# Patient Record
Sex: FEMALE | Race: BLACK OR AFRICAN AMERICAN | Employment: FULL TIME | URBAN - METROPOLITAN AREA
[De-identification: names, ages, dates, MRNs, and addresses within clinical notes are randomized per-mention and may not be internally consistent; named-entity substitution may affect disease eponyms.]

---

## 2018-04-23 ENCOUNTER — ANESTHESIA EVENT (OUTPATIENT)
Dept: ENDOSCOPY | Age: 63
End: 2018-04-23
Payer: COMMERCIAL

## 2018-04-23 ENCOUNTER — HOSPITAL ENCOUNTER (OUTPATIENT)
Age: 63
Setting detail: OUTPATIENT SURGERY
Discharge: HOME | End: 2018-04-23
Attending: INTERNAL MEDICINE | Admitting: INTERNAL MEDICINE
Payer: COMMERCIAL

## 2018-04-23 ENCOUNTER — ANESTHESIA (OUTPATIENT)
Dept: ENDOSCOPY | Age: 63
End: 2018-04-23
Payer: COMMERCIAL

## 2018-04-23 VITALS
RESPIRATION RATE: 16 BRPM | HEIGHT: 62 IN | OXYGEN SATURATION: 100 % | BODY MASS INDEX: 35.15 KG/M2 | HEART RATE: 84 BPM | WEIGHT: 191 LBS | SYSTOLIC BLOOD PRESSURE: 125 MMHG | TEMPERATURE: 98.4 F | DIASTOLIC BLOOD PRESSURE: 64 MMHG

## 2018-04-23 PROCEDURE — 74011250636 HC RX REV CODE- 250/636: Performed by: INTERNAL MEDICINE

## 2018-04-23 PROCEDURE — 74011000250 HC RX REV CODE- 250

## 2018-04-23 PROCEDURE — 76060000031 HC ANESTHESIA FIRST 0.5 HR: Performed by: INTERNAL MEDICINE

## 2018-04-23 PROCEDURE — 76040000019: Performed by: INTERNAL MEDICINE

## 2018-04-23 PROCEDURE — 74011250636 HC RX REV CODE- 250/636

## 2018-04-23 RX ORDER — SODIUM CHLORIDE 0.9 % (FLUSH) 0.9 %
5-10 SYRINGE (ML) INJECTION AS NEEDED
Status: DISCONTINUED | OUTPATIENT
Start: 2018-04-23 | End: 2018-04-23 | Stop reason: HOSPADM

## 2018-04-23 RX ORDER — NALOXONE HYDROCHLORIDE 0.4 MG/ML
0.4 INJECTION, SOLUTION INTRAMUSCULAR; INTRAVENOUS; SUBCUTANEOUS
Status: DISCONTINUED | OUTPATIENT
Start: 2018-04-23 | End: 2018-04-23 | Stop reason: HOSPADM

## 2018-04-23 RX ORDER — TRAMADOL HYDROCHLORIDE 300 MG/1
300 TABLET, FILM COATED, EXTENDED RELEASE ORAL AS NEEDED
COMMUNITY

## 2018-04-23 RX ORDER — FLUMAZENIL 0.1 MG/ML
0.2 INJECTION INTRAVENOUS
Status: DISCONTINUED | OUTPATIENT
Start: 2018-04-23 | End: 2018-04-23 | Stop reason: HOSPADM

## 2018-04-23 RX ORDER — MIDAZOLAM HYDROCHLORIDE 1 MG/ML
.25-1 INJECTION, SOLUTION INTRAMUSCULAR; INTRAVENOUS
Status: DISCONTINUED | OUTPATIENT
Start: 2018-04-23 | End: 2018-04-23 | Stop reason: HOSPADM

## 2018-04-23 RX ORDER — LIDOCAINE HYDROCHLORIDE 20 MG/ML
INJECTION, SOLUTION EPIDURAL; INFILTRATION; INTRACAUDAL; PERINEURAL AS NEEDED
Status: DISCONTINUED | OUTPATIENT
Start: 2018-04-23 | End: 2018-04-23 | Stop reason: HOSPADM

## 2018-04-23 RX ORDER — SODIUM CHLORIDE 9 MG/ML
100 INJECTION, SOLUTION INTRAVENOUS CONTINUOUS
Status: DISCONTINUED | OUTPATIENT
Start: 2018-04-23 | End: 2018-04-23 | Stop reason: HOSPADM

## 2018-04-23 RX ORDER — PROPOFOL 10 MG/ML
INJECTION, EMULSION INTRAVENOUS AS NEEDED
Status: DISCONTINUED | OUTPATIENT
Start: 2018-04-23 | End: 2018-04-23 | Stop reason: HOSPADM

## 2018-04-23 RX ORDER — EPINEPHRINE 0.1 MG/ML
1 INJECTION INTRACARDIAC; INTRAVENOUS
Status: DISCONTINUED | OUTPATIENT
Start: 2018-04-23 | End: 2018-04-23 | Stop reason: HOSPADM

## 2018-04-23 RX ORDER — SODIUM CHLORIDE 0.9 % (FLUSH) 0.9 %
5-10 SYRINGE (ML) INJECTION EVERY 8 HOURS
Status: DISCONTINUED | OUTPATIENT
Start: 2018-04-23 | End: 2018-04-23 | Stop reason: HOSPADM

## 2018-04-23 RX ORDER — DEXTROMETHORPHAN/PSEUDOEPHED 2.5-7.5/.8
1.2 DROPS ORAL
Status: DISCONTINUED | OUTPATIENT
Start: 2018-04-23 | End: 2018-04-23 | Stop reason: HOSPADM

## 2018-04-23 RX ORDER — ATROPINE SULFATE 0.1 MG/ML
0.5 INJECTION INTRAVENOUS
Status: DISCONTINUED | OUTPATIENT
Start: 2018-04-23 | End: 2018-04-23 | Stop reason: HOSPADM

## 2018-04-23 RX ORDER — FENTANYL CITRATE 50 UG/ML
200 INJECTION, SOLUTION INTRAMUSCULAR; INTRAVENOUS
Status: DISCONTINUED | OUTPATIENT
Start: 2018-04-23 | End: 2018-04-23 | Stop reason: HOSPADM

## 2018-04-23 RX ADMIN — SODIUM CHLORIDE: 900 INJECTION, SOLUTION INTRAVENOUS at 11:09

## 2018-04-23 RX ADMIN — LIDOCAINE HYDROCHLORIDE 20 MG: 20 INJECTION, SOLUTION EPIDURAL; INFILTRATION; INTRACAUDAL; PERINEURAL at 11:24

## 2018-04-23 RX ADMIN — PROPOFOL 100 MG: 10 INJECTION, EMULSION INTRAVENOUS at 11:24

## 2018-04-23 RX ADMIN — PROPOFOL 50 MG: 10 INJECTION, EMULSION INTRAVENOUS at 11:26

## 2018-04-23 NOTE — H&P
[address], [address] [place] [address]      History and Physical       NAME:  Christy Benjamin   :   1955   MRN:   [id number]             History of Present Illness:  Patient is a 65 y.o. who is seen for anemia, abnormal PET scan. PMH:  History reviewed. No pertinent past medical history. PSH:  Past Surgical History:   Procedure Laterality Date    DILATION AND CURETTAGE      HX HYSTERECTOMY         Allergies: Allergies   Allergen Reactions    Latex Rash    Pcn [Penicillins] Swelling    Sulfur Nausea Only       Home Medications:  Prior to Admission Medications   Prescriptions Last Dose Informant Patient Reported? Taking? alprazolam (XANAX) 0.25 mg tablet Not Taking at Unknown time  Yes No   Sig: Take 0.25 mg by mouth two (2) times daily as needed. calcium carbonate (TUMS) 200 mg calcium (500 mg) chew 2018 at Unknown time  Yes Yes   Sig: Take 3 tablets by mouth daily. cholecalciferol, vitamin D3, (VITAMIN D3) 2,000 unit tab 2018 at Unknown time  Yes Yes   Sig: Take 2,000 Units by mouth every other day. citalopram (CELEXA) 20 mg tablet Not Taking at Unknown time  Yes No   Sig: Take 10 mg by mouth daily. cyanocobalamin (VITAMIN B-12) 500 mcg tablet 2018 at Unknown time  Yes Yes   Sig: Take 500 mcg by mouth daily. fluticasone (FLONASE) 50 mcg/actuation nasal spray Not Taking at Unknown time  Yes No   Si sprays nightly. hydrocodone-acetaminophen (LORTAB 7.5) 7.5-500 mg per tablet Not Taking at Unknown time  Yes No   Sig: Take 1 Tab by mouth every four (4) hours as needed. multivitamin with iron (DAILY MULTI-VITAMINS/IRON) tablet 2018 at Unknown time  Yes Yes   Sig: Take 1 tablet by mouth daily. Indications: MINERAL DEFICIENCY PREVENTION   omeprazole (PRILOSEC) 40 mg capsule Not Taking at Unknown time  Yes No   Sig: Take 40 mg by mouth daily.    ranitidine (ZANTAC) 300 mg tablet Not Taking at Unknown time  Yes No   Sig: Take 300 mg by mouth daily. traMADol 300 mg TM24 4/22/2018 at Unknown time  Yes Yes   Sig: Take 300 mg by mouth as needed. Facility-Administered Medications: None       Hospital Medications:  Current Facility-Administered Medications   Medication Dose Route Frequency    0.9% sodium chloride infusion  100 mL/hr IntraVENous CONTINUOUS    sodium chloride (NS) flush 5-10 mL  5-10 mL IntraVENous Q8H    sodium chloride (NS) flush 5-10 mL  5-10 mL IntraVENous PRN    midazolam (VERSED) injection 0.25-10 mg  0.25-10 mg IntraVENous Multiple    fentaNYL citrate (PF) injection 200 mcg  200 mcg IntraVENous Multiple    naloxone (NARCAN) injection 0.4 mg  0.4 mg IntraVENous Multiple    flumazenil (ROMAZICON) 0.1 mg/mL injection 0.2 mg  0.2 mg IntraVENous Multiple    simethicone (MYLICON) 77EB/3.1ST oral drops 80 mg  1.2 mL Oral Multiple    atropine injection 0.5 mg  0.5 mg IntraVENous ONCE PRN    EPINEPHrine (ADRENALIN) 0.1 mg/mL syringe 1 mg  1 mg Endoscopically ONCE PRN       Social History:  Social History   Substance Use Topics    Smoking status: Never Smoker    Smokeless tobacco: Not on file    Alcohol use No       Family History:  History reviewed. No pertinent family history. Review of Systems:      Constitutional: negative fever, negative chills, negative weight loss  Eyes:   negative visual changes  ENT:   negative sore throat, tongue or lip swelling  Respiratory:  negative cough, negative dyspnea  Cards:  negative for chest pain, palpitations, lower extremity edema  GI:   See HPI  :  negative for frequency, dysuria  Integument:  negative for rash and pruritus  Heme:  negative for easy bruising and gum/nose bleeding  Musculoskel: negative for myalgias,  back pain and muscle weakness  Neuro: negative for headaches, dizziness, vertigo  Psych:  negative for feelings of anxiety, depression       Objective:   No data found.             EXAM:     NEURO-a&o   HEENT-wnl   LUNGS-clear    COR-regular rate and rhythym     ABD-soft , no tenderness, no rebound, good bs     EXT-no edema     Data Review     No results for input(s): WBC, HGB, HCT, PLT, HGBEXT, HCTEXT, PLTEXT in the last 72 hours. No results for input(s): NA, K, CL, CO2, BUN, CREA, GLU, PHOS, CA in the last 72 hours. No results for input(s): SGOT, GPT, AP, TBIL, TP, ALB, GLOB, GGT, AML, LPSE in the last 72 hours. No lab exists for component: AMYP, HLPSE  No results for input(s): INR, PTP, APTT in the last 72 hours. No lab exists for component: INREXT       Assessment:   · Anemia, abnormal PET scan   There is no problem list on file for this patient.         Plan:   · Endoscopic procedure with sedation     Signed By: Jory Gastelum MD     4/23/2018  11:10 AM

## 2018-04-23 NOTE — PROCEDURES
Ramana 65  [address]        [facility]- Gastroduodenoscopy (EGD) Procedure Note    Joo Mullen  1955  [id number]      Procedure: Endoscopic Gastroduodenoscopy --diagnostic    Indication:  abnormal PET scan with high uptake in proximal esophagus     Pre-operative Diagnosis: see indication above    Post-operative Diagnosis: see findings below    : Royer Interiano MD    Referring Provider:  Kd Trujillo MD      Anesthesia/Sedation:  MAC anesthesia Propofol        Procedure Details     After infomed consent was obtained for the procedure, with all risks and benefits of procedure explained the patient was taken to the endoscopy suite and placed in the left lateral decubitus position. Following sequential administration of sedation as per above, the endoscope was inserted into the mouth and advanced under direct vision to third portion of the duodenum. A careful inspection was made as the gastroscope was withdrawn, including a retroflexed view of the proximal stomach; findings and interventions are described below. Findings:   Esophagus:normal  Stomach: normal gastric bypass anatomy  jejunum: normal      Therapies:  none    Specimens: none         EBL: None      Complications:   None; patient tolerated the procedure well. Impression:    -See post-procedure diagnoses.     Recommendations:  -follow up with her hematologist, Dr. Fly Givens as scheduled    Signed By: Royer Interiano MD     4/23/2018  11:30 AM

## 2018-04-23 NOTE — ANESTHESIA PREPROCEDURE EVALUATION
Anesthetic History   No history of anesthetic complications            Review of Systems / Medical History  Patient summary reviewed, nursing notes reviewed and pertinent labs reviewed    Pulmonary  Within defined limits                 Neuro/Psych   Within defined limits           Cardiovascular  Within defined limits                Exercise tolerance: >4 METS     GI/Hepatic/Renal     GERD           Endo/Other    Diabetes: type 2         Other Findings              Physical Exam    Airway  Mallampati: II  TM Distance: > 6 cm  Neck ROM: normal range of motion   Mouth opening: Normal     Cardiovascular  Regular rate and rhythm,  S1 and S2 normal,  no murmur, click, rub, or gallop             Dental  No notable dental hx       Pulmonary  Breath sounds clear to auscultation               Abdominal  GI exam deferred       Other Findings            Anesthetic Plan    ASA: 2  Anesthesia type: MAC          Induction: Intravenous  Anesthetic plan and risks discussed with: Patient

## 2018-04-23 NOTE — IP AVS SNAPSHOT
1111 [facility] [address] [place] [address] 
[phone] Patient: Nessa Claire MRN: [id number] JSE:9[birth year]2543 About your hospitalization You were admitted on:  April 23, 2018 You last received care in the:  [facility] ENDOSCOPY You were discharged on:  April 23, 2018 Why you were hospitalized Your primary diagnosis was:  Not on File Follow-up Information None Discharge Orders None A check miguel angel indicates which time of day the medication should be taken. My Medications CONTINUE taking these medications Instructions Each Dose to Equal  
 Morning Noon Evening Bedtime  
 calcium carbonate 200 mg calcium (500 mg) Chew Commonly known as:  TUMS Your last dose was: Your next dose is: Take 3 tablets by mouth daily. 3 Tab  
    
   
   
   
  
 citalopram 20 mg tablet Commonly known as:  Deb Langston Your last dose was: Your next dose is: Take 10 mg by mouth daily. 10 mg  
    
   
   
   
  
 DAILY MULTI-VITAMINS/IRON tablet Generic drug:  multivitamin with iron Your last dose was: Your next dose is: Take 1 tablet by mouth daily. Indications: MINERAL DEFICIENCY PREVENTION  
 1 Tab FLONASE 50 mcg/actuation nasal spray Generic drug:  fluticasone Your last dose was: Your next dose is:    
   
   
 2 sprays nightly. 2 [place] Lortab 7.5 7.5-500 mg per tablet Generic drug:  HYDROcodone-acetaminophen Your last dose was: Your next dose is: Take 1 Tab by mouth every four (4) hours as needed. 1 Tab PriLOSEC 40 mg capsule Generic drug:  omeprazole Your last dose was: Your next dose is: Take 40 mg by mouth daily. 40 mg  
    
   
   
   
  
 traMADol 300 mg Tm24 Your last dose was: Your next dose is: Take 300 mg by mouth as needed. 300 mg  
    
   
   
   
  
 VITAMIN B-12 500 mcg tablet Generic drug:  cyanocobalamin Your last dose was: Your next dose is: Take 500 mcg by mouth daily. 500 mcg VITAMIN D3 2,000 unit Tab Generic drug:  cholecalciferol (vitamin D3) Your last dose was: Your next dose is: Take 2,000 Units by mouth every other day. 2000 Units XANAX 0.25 mg tablet Generic drug:  ALPRAZolam  
   
Your last dose was: Your next dose is: Take 0.25 mg by mouth two (2) times daily as needed. 0.25 mg  
    
   
   
   
  
 ZANTAC 300 mg tablet Generic drug:  raNITIdine Your last dose was: Your next dose is: Take 300 mg by mouth daily. 300 mg Opioid Education Prescription Opioids: What You Need to Know: 
 
Prescription opioids can be used to help relieve moderate-to-severe pain and are often prescribed following a surgery or injury, or for certain health conditions. These medications can be an important part of treatment but also come with serious risks. Opioids are strong pain medicines. Examples include hydrocodone, oxycodone, fentanyl, and morphine. Heroin is an example of an illegal opioid. It is important to work with your health care provider to make sure you are getting the safest, most effective care. WHAT ARE THE RISKS AND SIDE EFFECTS OF OPIOID USE? Prescription opioids carry serious risks of addiction and overdose, especially with prolonged use. An opioid overdose, often marked by slow breathing, can cause sudden death. The use of prescription opioids can have a number of side effects as well, even when taken as directed. · Tolerance-meaning you might need to take more of a medication for the same pain relief · Physical dependence-meaning you have symptoms of withdrawal when the medication is stopped. Withdrawal symptoms can include nausea, sweating, chills, diarrhea, stomach cramps, and muscle aches. Withdrawal can last up to several weeks, depending on which drug you took and how long you took it. · Increased sensitivity to pain · Constipation · Nausea, vomiting, and dry mouth · Sleepiness and dizziness · Confusion · Depression · Low levels of testosterone that can result in lower sex drive, energy, and strength · Itching and sweating RISKS ARE GREATER WITH:      
· History of drug misuse, substance use disorder, or overdose · Mental health conditions (such as depression or anxiety) · Sleep apnea · Older age (72 years or older) · Pregnancy Avoid alcohol while taking prescription opioids. Also, unless specifically advised by your health care provider, medications to avoid include: · Benzodiazepines (such as Xanax or Valium) · Muscle relaxants (such as Soma or Flexeril) · Hypnotics (such as Ambien or Lunesta) · Other prescription opioids KNOW YOUR OPTIONS Talk to your health care provider about ways to manage your pain that don't involve prescription opioids. Some of these options may actually work better and have fewer risks and side effects. Options may include: 
· Pain relievers such as acetaminophen, ibuprofen, and naproxen · Some medications that are also used for depression or seizures · Physical therapy and exercise · Counseling to help patients learn how to cope better with triggers of pain and stress. · Application of heat or cold compress · Massage therapy · Relaxation techniques Be Informed Make sure you know the name of your medication, how much and how often to take it, and its potential risks & side effects. IF YOU ARE PRESCRIBED OPIOIDS FOR PAIN: 
· Never take opioids in greater amounts or more often than prescribed. Remember the goal is not to be pain-free but to manage your pain at a tolerable level. · Follow up with your primary care provider to: · Work together to create a plan on how to manage your pain. · Talk about ways to help manage your pain that don't involve prescription opioids. · Talk about any and all concerns and side effects. · Help prevent misuse and abuse. · Never sell or share prescription opioids · Help prevent misuse and abuse. · Store prescription opioids in a secure place and out of reach of others (this may include visitors, children, friends, and family). · Safely dispose of unused/unwanted prescription opioids: Find your community drug take-back program or your pharmacy mail-back program, or flush them down the toilet, following guidance from the Food and Drug Administration (www.fda.gov/Drugs/ResourcesForYou). · Visit www.cdc.gov/drugoverdose to learn about the risks of opioid abuse and overdose. · If you believe you may be struggling with addiction, tell your health care provider and ask for guidance or call [address] [place] [address] at 3-820-233-HELP. Discharge Instructions [address] 
611 [facility] DISCHARGE INSTRUCTIONS Amanda Blank [id number] 
1955 Discomfort: 
Sore throat-  warm salt water gargle 
redness at IV site- apply warm compress to area; if redness or soreness persist- contact your physician Gaseous discomfort- walking, belching will help relieve any discomfort You may not operate a vehicle for 12 hours You may not engage in an occupation involving machinery or appliances for rest of today You may not drink alcoholic beverages for at least 12 hours Avoid making any critical decisions for at least 24 hour DIET You may resume your regular diet  however -  remember your colon is empty and a heavy meal will produce gas. Avoid these foods:  vegetables, fried / greasy foods, carbonated drinks ACTIVITY You may resume your normal daily activities Spend the remainder of the day resting -  avoid any strenuous activity. CALL M.D. ANY SIGN OF Increasing pain, nausea, vomiting Abdominal distension (swelling) New increased bleeding (oral or rectal) Fever (chills) Pain in chest area Shortness of breath Follow-up Instructions: 
 Call Dr. Tracy Plaza for any questions or problems and follow up as needed Telephone # [phone] Follow up with Dr. Allegra Patel as scheduled ENDOSCOPY FINDINGS: 
 Your endoscopy showed normal exam, no mass or other lesions seen. Signed By: Tracy Plaza MD   
 4/23/2018  11:32 AM 
  
 
 
  
  
  
Introducing [facility]! Dear Louise Oliver V: 
Thank you for requesting a [employer] account. Our records indicate that you have previously registered for a [email or web address] account but its currently inactive. Please call our [email or web address] support line at [phone]. Additional Information If you have questions, please visit the Frequently Asked Questions section of the [email or web address] website at [email or web address] Remember, [employer] is NOT to be used for urgent needs. For medical emergencies, dial 911. Now available from your iPhone and Android! Introducing Farhan Perez As a [employer] patient, I wanted to make you aware of our electronic visit tool called Farhan Jasoncarole. [employer] 24/7 allows you to connect within minutes with a medical provider 24 hours a day, seven days a week via a mobile device or tablet or logging into a secure website from your computer. You can access Farhan Perez from anywhere in the [place].  
 
A virtual visit might be right for you when you have a simple condition and feel like you just dont want to get out of bed, or cant get away from work for an appointment, when your regular [employer] provider is not available (evenings, weekends or holidays), or when youre out of town and need minor care. Electronic visits cost only $49 and if the [employer] provider determines a prescription is needed to treat your condition, one can be electronically transmitted to a nearby pharmacy*. Please take a moment to enroll today if you have not already done so. The enrollment process is free and takes just a few minutes. To enroll, please download the [employer] higinio to your tablet or phone, or visit [email or web address] to enroll on your computer. And, as an [address] [place] [address] patient with a [employer] account, the results of your visits will be scanned into your electronic medical record and your primary care provider will be able to view the scanned results. We urge you to continue to see your regular [facility] provider for your ongoing medical care. And while your primary care provider may not be the one available when you seek a [employer] virtual visit, the peace of mind you get from getting a real diagnosis real time can be priceless. For more information on In[employer], view our Frequently Asked Questions (FAQs) at [email or web address]. Sincerely, 
 
Regino Langley MD 
Chief Medical Officer [employer] *:  certain medications cannot be prescribed via [employer] Providers Seen During Your Hospitalization Provider Specialty Primary office phone Oma Pittman MD Gastroenterology [phone] Your Primary Care Physician (PCP) Primary Care Physician Office Phone Office Fax Elen Caldera [phone] You are allergic to the following Allergen Reactions Latex Rash Pcn (Penicillins) Swelling Sulfur Nausea Only Recent Documentation Height Weight Breastfeeding? BMI OB Status Smoking Status 1.575 m 86.6 kg No 34.93 kg/m2 Hysterectomy Never Smoker Emergency Contacts Name Discharge Info Relation Home Work Mobile [address] [place] [address] CAREGIVER [3] Other Relative [6] [phone] Patient Belongings The following personal items are in your possession at time of discharge: 
  Dental Appliances: None  Visual Aid: None Please provide this summary of care documentation to your next provider. Signatures-by signing, you are acknowledging that this After Visit Summary has been reviewed with you and you have received a copy. Patient Signature:  ____________________________________________________________ Date:  ____________________________________________________________  
  
[facility] Provider Signature:  ____________________________________________________________ Date:  ____________________________________________________________

## 2018-04-23 NOTE — PERIOP NOTES

## 2018-04-23 NOTE — ANESTHESIA POSTPROCEDURE EVALUATION
Post-Anesthesia Evaluation and Assessment    Patient: Liberty Soler MRN: [id number]  SSN: [id number]    YOB: 1955  Age: 65 y.o. Sex: female       Cardiovascular Function/Vital Signs  Visit Vitals    /64    Pulse 84    Temp 36.9 °C (98.4 °F)    Resp 16    Ht 5' 2\" (1.575 m)    Wt 86.6 kg (191 lb)    SpO2 100%    Breastfeeding No    BMI 34.93 kg/m2       Patient is status post MAC anesthesia for Procedure(s):  ESOPHAGOGASTRODUODENOSCOPY (EGD). Nausea/Vomiting: None    Postoperative hydration reviewed and adequate. Pain:  Pain Scale 1: Numeric (0 - 10) (04/23/18 1209)  Pain Intensity 1: 0 (04/23/18 1209)   Managed    Neurological Status: At baseline    Mental Status and Level of Consciousness: Arousable    Pulmonary Status:   O2 Device: Room air (04/23/18 1209)   Adequate oxygenation and airway patent    Complications related to anesthesia: None    Post-anesthesia assessment completed.  No concerns    Signed By: Mackenzie Cole DO     April 23, 2018

## 2018-04-23 NOTE — ROUTINE PROCESS
Becky Plain  1955  [id number]    Situation:  Verbal report received from: [address] [place] [address] RN  Procedure: Procedure(s):  ESOPHAGOGASTRODUODENOSCOPY (EGD)    Background:    Preoperative diagnosis: ABNORMAL FINDING IN ESOPHAGUS  Postoperative diagnosis: 1. S/P gastric bypass - normal findings    :  Dr. Jasvir Leavitt  Assistant(s): Endoscopy Technician-1: Dudley Awan  Endoscopy RN-1: Josh Sicard    Specimens: * No specimens in log *  H. Pylori  no    Assessment:  Intra-procedure medications   Anesthesia gave intra-procedure sedation and medications, see anesthesia flow sheet yes    Intravenous fluids: NS@ KVO     Vital signs stable     Abdominal assessment: round and soft     Recommendation:  Discharge patient per MD order.     Family or Friend   Permission to share finding with family or friend yes

## 2018-04-23 NOTE — DISCHARGE INSTRUCTIONS
[address]  174 [facility], [address] [place] [address]    EGD DISCHARGE INSTRUCTIONS    Samir Romero  [id number]  1955    Discomfort:  Sore throat-  warm salt water gargle  redness at IV site- apply warm compress to area; if redness or soreness persist- contact your physician  Gaseous discomfort- walking, belching will help relieve any discomfort  You may not operate a vehicle for 12 hours  You may not engage in an occupation involving machinery or appliances for rest of today  You may not drink alcoholic beverages for at least 12 hours  Avoid making any critical decisions for at least 24 hour  DIET  You may resume your regular diet - however -  remember your colon is empty and a heavy meal will produce gas. Avoid these foods:  vegetables, fried / greasy foods, carbonated drinks    ACTIVITY  You may resume your normal daily activities   Spend the remainder of the day resting -  avoid any strenuous activity. CALL M.D. ANY SIGN OF   Increasing pain, nausea, vomiting  Abdominal distension (swelling)  New increased bleeding (oral or rectal)  Fever (chills)  Pain in chest area    Shortness of breath    Follow-up Instructions:   Call Dr. Dana Burden for any questions or problems and follow up as needed  Telephone # [phone]   Follow up with Dr. Elizabeth Coles as scheduled  ENDOSCOPY FINDINGS:   Your endoscopy showed normal exam, no mass or other lesions seen.     Signed By: Dana Burden MD     4/23/2018  11:32 AM

## 2018-04-23 NOTE — PROGRESS NOTES
Discharge instructions reviewed with brother,all questions answered. Copy to brother.  Computer signed

## 2018-10-17 RX ORDER — ZOLEDRONIC ACID 5 MG/100ML
5 INJECTION, SOLUTION INTRAVENOUS ONCE
Status: DISCONTINUED | OUTPATIENT
Start: 2018-10-22 | End: 2018-10-22

## 2018-10-17 RX ORDER — ACETAMINOPHEN 325 MG/1
650 TABLET ORAL ONCE
Status: ACTIVE | OUTPATIENT
Start: 2018-10-22 | End: 2018-10-22

## 2018-10-22 ENCOUNTER — HOSPITAL ENCOUNTER (OUTPATIENT)
Dept: INFUSION THERAPY | Age: 63
Discharge: HOME | End: 2018-10-22
Payer: COMMERCIAL

## 2018-10-22 VITALS
RESPIRATION RATE: 16 BRPM | TEMPERATURE: 98.6 F | DIASTOLIC BLOOD PRESSURE: 83 MMHG | BODY MASS INDEX: 32.01 KG/M2 | SYSTOLIC BLOOD PRESSURE: 145 MMHG | HEART RATE: 67 BPM | WEIGHT: 175 LBS

## 2018-10-22 LAB
ALBUMIN SERPL-MCNC: 3.8 G/DL (ref 3.5–5)
ANION GAP BLD CALC-SCNC: 13 MMOL/L (ref 10–20)
ANION GAP SERPL CALC-SCNC: 11 MMOL/L (ref 5–15)
BUN BLD-MCNC: 4 MG/DL (ref 9–20)
BUN SERPL-MCNC: 5 MG/DL (ref 6–20)
BUN/CREAT SERPL: 9 (ref 12–20)
CA-I BLD-MCNC: 1.08 MMOL/L (ref 1.12–1.32)
CALCIUM SERPL-MCNC: 9.1 MG/DL (ref 8.5–10.1)
CHLORIDE BLD-SCNC: 108 MMOL/L (ref 98–107)
CHLORIDE SERPL-SCNC: 107 MMOL/L (ref 97–108)
CO2 BLD-SCNC: 25 MMOL/L (ref 21–32)
CO2 SERPL-SCNC: 25 MMOL/L (ref 21–32)
CREAT BLD-MCNC: 0.5 MG/DL (ref 0.6–1.3)
CREAT SERPL-MCNC: 0.57 MG/DL (ref 0.55–1.02)
GLUCOSE BLD-MCNC: 91 MG/DL (ref 65–100)
GLUCOSE SERPL-MCNC: 89 MG/DL (ref 65–100)
HCT VFR BLD CALC: 36 % (ref 35–47)
POTASSIUM BLD-SCNC: 3.9 MMOL/L (ref 3.5–5.1)
POTASSIUM SERPL-SCNC: 4 MMOL/L (ref 3.5–5.1)
SERVICE CMNT-IMP: ABNORMAL
SODIUM BLD-SCNC: 142 MMOL/L (ref 136–145)
SODIUM SERPL-SCNC: 143 MMOL/L (ref 136–145)

## 2018-10-22 PROCEDURE — 82040 ASSAY OF SERUM ALBUMIN: CPT | Performed by: INTERNAL MEDICINE

## 2018-10-22 PROCEDURE — 80047 BASIC METABLC PNL IONIZED CA: CPT

## 2018-10-22 PROCEDURE — 80048 BASIC METABOLIC PNL TOTAL CA: CPT | Performed by: INTERNAL MEDICINE

## 2018-10-22 PROCEDURE — 96374 THER/PROPH/DIAG INJ IV PUSH: CPT

## 2018-10-22 PROCEDURE — 74011250636 HC RX REV CODE- 250/636: Performed by: INTERNAL MEDICINE

## 2018-10-22 PROCEDURE — 36415 COLL VENOUS BLD VENIPUNCTURE: CPT | Performed by: INTERNAL MEDICINE

## 2018-10-22 RX ORDER — GABAPENTIN 100 MG/1
100 CAPSULE ORAL DAILY
COMMUNITY
End: 2019-10-23 | Stop reason: CLARIF

## 2018-10-22 RX ORDER — ZOLEDRONIC ACID 5 MG/100ML
5 INJECTION, SOLUTION INTRAVENOUS ONCE
Status: COMPLETED | OUTPATIENT
Start: 2018-10-22 | End: 2018-10-22

## 2018-10-22 RX ADMIN — ZOLEDRONIC ACID 5 MG: 5 INJECTION, SOLUTION INTRAVENOUS at 16:40

## 2018-10-22 NOTE — PROGRESS NOTES
[facility] VISIT NOTE 
 
4655. Pt arrived at [facility] ambulatory and in no distress for Yearly Reclast.  Assessment completed, pt states no complaints. 24g PIV placed in Left AC. Positive blood return noted and labs drawn. iCa not within limits to treat. BMP sent to lab. Calcium within limits to treat. Medications received: 
Reclast IV Patient Vitals for the past 12 hrs: 
 Temp Pulse Resp BP  
10/22/18 1455 98.6 °F (37 °C) 67 16 145/83 Recent Results (from the past 12 hour(s)) POC CHEM8 Collection Time: 10/22/18  3:11 PM  
Result Value Ref Range Calcium, ionized (POC) 1.08 (L) 1.12 - 1.32 mmol/L Sodium (POC) 142 136 - 145 mmol/L Potassium (POC) 3.9 3.5 - 5.1 mmol/L Chloride (POC) 108 (H) 98 - 107 mmol/L  
 CO2 (POC) 25 21 - 32 mmol/L Anion gap (POC) 13 10 - 20 mmol/L Glucose (POC) 91 65 - 100 mg/dL BUN (POC) 4 (L) 9 - 20 mg/dL Creatinine (POC) 0.5 (L) 0.6 - 1.3 mg/dL GFRAA, POC >60 >60 ml/min/1.73m2 GFRNA, POC >60 >60 ml/min/1.73m2 Hematocrit (POC) 36 35.0 - 47.0 % Comment Comment Not Indicated. METABOLIC PANEL, BASIC Collection Time: 10/22/18  3:20 PM  
Result Value Ref Range Sodium 143 136 - 145 mmol/L Potassium 4.0 3.5 - 5.1 mmol/L Chloride 107 97 - 108 mmol/L  
 CO2 25 21 - 32 mmol/L Anion gap 11 5 - 15 mmol/L Glucose 89 65 - 100 mg/dL BUN 5 (L) 6 - 20 MG/DL Creatinine 0.57 0.55 - 1.02 MG/DL  
 BUN/Creatinine ratio 9 (L) 12 - 20 GFR est AA >60 >60 ml/min/1.73m2 GFR est non-AA >60 >60 ml/min/1.73m2 Calcium 9.1 8.5 - 10.1 MG/DL ALBUMIN Collection Time: 10/22/18  3:20 PM  
Result Value Ref Range Albumin 3.8 3.5 - 5.0 g/dL Patient declined to stay for 30 minutes observation post infusion. Tolerated treatment well, no adverse reaction noted. PIV removed at discharge, no s/s of infiltration noted.  
 
1700. D/C'd from [facility] ambulatory and in no distress.

## 2019-10-18 RX ORDER — ZOLEDRONIC ACID 5 MG/100ML
5 INJECTION, SOLUTION INTRAVENOUS ONCE
Status: COMPLETED | OUTPATIENT
Start: 2019-10-23 | End: 2019-10-23

## 2019-10-23 ENCOUNTER — HOSPITAL ENCOUNTER (OUTPATIENT)
Dept: INFUSION THERAPY | Age: 64
Discharge: HOME | End: 2019-10-23
Payer: COMMERCIAL

## 2019-10-23 VITALS
SYSTOLIC BLOOD PRESSURE: 139 MMHG | WEIGHT: 183 LBS | RESPIRATION RATE: 16 BRPM | HEIGHT: 62 IN | OXYGEN SATURATION: 97 % | DIASTOLIC BLOOD PRESSURE: 91 MMHG | HEART RATE: 87 BPM | BODY MASS INDEX: 33.68 KG/M2 | TEMPERATURE: 99.6 F

## 2019-10-23 PROCEDURE — 96374 THER/PROPH/DIAG INJ IV PUSH: CPT

## 2019-10-23 PROCEDURE — 74011250636 HC RX REV CODE- 250/636: Performed by: INTERNAL MEDICINE

## 2019-10-23 RX ORDER — IBUPROFEN 800 MG/1
800 TABLET ORAL
COMMUNITY

## 2019-10-23 RX ADMIN — ZOLEDRONIC ACID 5 MG: 5 INJECTION, SOLUTION INTRAVENOUS at 13:12

## 2020-10-21 NOTE — CALL BACK NOTE
New/updated order required for patient's upcoming [facility] appointment. Faxed doctor's office on 10/21/20 at 10:47 AM.  Refer to fax documents in pharmacy for further information.

## 2020-10-23 RX ORDER — ZOLEDRONIC ACID 5 MG/100ML
5 INJECTION, SOLUTION INTRAVENOUS ONCE
Status: COMPLETED | OUTPATIENT
Start: 2020-10-28 | End: 2020-10-28

## 2020-10-28 ENCOUNTER — HOSPITAL ENCOUNTER (OUTPATIENT)
Dept: INFUSION THERAPY | Age: 65
Discharge: HOME | End: 2020-10-28
Payer: COMMERCIAL

## 2020-10-28 VITALS
WEIGHT: 202.6 LBS | TEMPERATURE: 99 F | OXYGEN SATURATION: 99 % | DIASTOLIC BLOOD PRESSURE: 78 MMHG | RESPIRATION RATE: 18 BRPM | SYSTOLIC BLOOD PRESSURE: 128 MMHG | HEART RATE: 75 BPM | BODY MASS INDEX: 37.06 KG/M2

## 2020-10-28 PROCEDURE — 74011250636 HC RX REV CODE- 250/636: Performed by: INTERNAL MEDICINE

## 2020-10-28 PROCEDURE — 96374 THER/PROPH/DIAG INJ IV PUSH: CPT

## 2020-10-28 RX ADMIN — ZOLEDRONIC ACID 5 MG: 5 INJECTION, SOLUTION INTRAVENOUS at 14:02

## 2020-10-28 NOTE — PROGRESS NOTES
Outpatient Infusion Center Short Visit Progress Note    1310 Patient admitted to [facility] for Reclast infusion ambulatory in stable condition. Assessment completed. No new concerns voiced. Vital Signs:  Visit Vitals  /78   Pulse 75   Temp 99 °F (37.2 °C)   Resp 18   Wt 91.9 kg (202 lb 9.6 oz)   SpO2 99%   BMI 37.06 kg/m²       Peripheral IV (24g) inserted into left AC  with positive blood return. Lab Results:  Labs scanned in from provider's office. Medications:  Medications Administered     zoledronic acid (RECLAST) IVPB 5 mg     Admin Date  10/28/2020 Action  Given Dose  5 mg Rate  400 mL/hr Route  IntraVENous Administered By  Eriberto Otero, NADEGE                3904  Patient tolerated treatment well. Patient discharged from [place] [address] ambulatory in no distress.       Dyllan Gordon RN

## 2021-10-22 RX ORDER — ZOLEDRONIC ACID 5 MG/100ML
5 INJECTION, SOLUTION INTRAVENOUS ONCE
Status: COMPLETED | OUTPATIENT
Start: 2021-10-29 | End: 2021-10-29

## 2021-10-29 ENCOUNTER — HOSPITAL ENCOUNTER (OUTPATIENT)
Dept: INFUSION THERAPY | Age: 66
Discharge: HOME | End: 2021-10-29
Payer: COMMERCIAL

## 2021-10-29 VITALS
HEART RATE: 80 BPM | WEIGHT: 208.4 LBS | BODY MASS INDEX: 38.35 KG/M2 | OXYGEN SATURATION: 95 % | TEMPERATURE: 98.4 F | DIASTOLIC BLOOD PRESSURE: 73 MMHG | SYSTOLIC BLOOD PRESSURE: 128 MMHG | RESPIRATION RATE: 18 BRPM | HEIGHT: 62 IN

## 2021-10-29 PROCEDURE — 96374 THER/PROPH/DIAG INJ IV PUSH: CPT

## 2021-10-29 PROCEDURE — 74011250636 HC RX REV CODE- 250/636: Performed by: INTERNAL MEDICINE

## 2021-10-29 RX ORDER — PREGABALIN 75 MG/1
CAPSULE ORAL 3 TIMES DAILY
COMMUNITY

## 2021-10-29 RX ORDER — TIZANIDINE HYDROCHLORIDE 4 MG/1
4 CAPSULE, GELATIN COATED ORAL 3 TIMES DAILY
COMMUNITY

## 2021-10-29 RX ADMIN — ZOLEDRONIC ACID 5 MG: 5 INJECTION, SOLUTION INTRAVENOUS at 11:20

## 2021-10-29 NOTE — PROGRESS NOTES
[facility] Progress Note    Date: 2021    Name: Ivana Cintron    MRN: [id number]         : 1955    Ms. Laws arrived ambulatory and in no distress for Reclast Infusion. Assessment was completed, no acute issues at this time, no new complaints voiced. 24 G PIV established to left arm, + blood return. Patient's labs were reviewed from 10/9/21. Calcium 9.3. Ms. Stuart Viera vitals were reviewed. Patient Vitals for the past 24 hrs:   Temp Pulse Resp BP SpO2   10/29/21 1142 98.4 °F (36.9 °C) 80 18 128/73    10/29/21 1106 98.5 °F (36.9 °C) 73 20 136/74 95 %         Medications:  Medications Administered     zoledronic acid (RECLAST) IVPB 5 mg     Admin Date  10/29/2021 Action  New Bag Dose  5 mg Rate  400 mL/hr Route  IntraVENous Administered By  Selam Childs RN                Ms. Ceci Velez tolerated treatment well and was discharged from [place] [address] in stable condition at 1145. PIV flushed & removed. No future appointment planned until patient has a new bone scan, per patient.       Ghazala Rosado RN  2021

## 2021-11-04 LAB — HBA1C MFR BLD HPLC: 5.4 %

## 2022-05-26 LAB — HBA1C MFR BLD HPLC: 5.9 %

## 2022-06-07 ENCOUNTER — TELEPHONE (OUTPATIENT)
Dept: CARDIOLOGY CLINIC | Age: 67
End: 2022-06-07

## 2022-08-03 ENCOUNTER — OFFICE VISIT (OUTPATIENT)
Dept: CARDIOLOGY CLINIC | Age: 67
End: 2022-08-03
Payer: COMMERCIAL

## 2022-08-03 VITALS
HEIGHT: 62 IN | BODY MASS INDEX: 38.09 KG/M2 | OXYGEN SATURATION: 98 % | WEIGHT: 207 LBS | SYSTOLIC BLOOD PRESSURE: 110 MMHG | HEART RATE: 82 BPM | DIASTOLIC BLOOD PRESSURE: 80 MMHG

## 2022-08-03 DIAGNOSIS — Z87.898 HISTORY OF SYNCOPE: ICD-10-CM

## 2022-08-03 DIAGNOSIS — R73.03 PREDIABETES: ICD-10-CM

## 2022-08-03 DIAGNOSIS — R01.1 MURMUR, CARDIAC: Primary | ICD-10-CM

## 2022-08-03 DIAGNOSIS — E78.5 DYSLIPIDEMIA: ICD-10-CM

## 2022-08-03 PROBLEM — I25.10 CORONARY ARTERY CALCIFICATION: Status: ACTIVE | Noted: 2022-08-03

## 2022-08-03 PROBLEM — E66.812 CLASS 2 OBESITY DUE TO EXCESS CALORIES WITHOUT SERIOUS COMORBIDITY WITH BODY MASS INDEX (BMI) OF 37.0 TO 37.9 IN ADULT: Status: ACTIVE | Noted: 2022-08-03

## 2022-08-03 PROBLEM — E66.09 CLASS 2 OBESITY DUE TO EXCESS CALORIES WITHOUT SERIOUS COMORBIDITY WITH BODY MASS INDEX (BMI) OF 37.0 TO 37.9 IN ADULT: Status: ACTIVE | Noted: 2022-08-03

## 2022-08-03 PROBLEM — S22.000A COMPRESSION FRACTURE OF BODY OF THORACIC VERTEBRA (HCC): Status: ACTIVE | Noted: 2022-08-03

## 2022-08-03 PROBLEM — I10 PRIMARY HYPERTENSION: Status: ACTIVE | Noted: 2022-08-03

## 2022-08-03 PROBLEM — I25.84 CORONARY ARTERY CALCIFICATION: Status: ACTIVE | Noted: 2022-08-03

## 2022-08-03 PROBLEM — E61.1 IRON DEFICIENCY: Status: ACTIVE | Noted: 2022-08-03

## 2022-08-03 PROCEDURE — 99204 OFFICE O/P NEW MOD 45 MIN: CPT | Performed by: SPECIALIST

## 2022-08-03 PROCEDURE — 93000 ELECTROCARDIOGRAM COMPLETE: CPT | Performed by: SPECIALIST

## 2022-08-03 PROCEDURE — 1123F ACP DISCUSS/DSCN MKR DOCD: CPT | Performed by: SPECIALIST

## 2022-08-03 RX ORDER — ROSUVASTATIN CALCIUM 10 MG/1
10 TABLET, COATED ORAL
Qty: 90 TABLET | Refills: 3 | Status: SHIPPED | OUTPATIENT
Start: 2022-08-03

## 2022-08-03 RX ORDER — HYDROCHLOROTHIAZIDE 25 MG/1
TABLET ORAL
COMMUNITY
Start: 2022-07-13

## 2022-08-03 NOTE — PROGRESS NOTES
Chief Complaint   Patient presents with    New Patient     Ref by PCP for Murmur      Visit Vitals  /80 (BP 1 Location: Left upper arm, BP Patient Position: Sitting)   Pulse 82   Ht 5' 2\" (1.575 m)   Wt 207 lb (93.9 kg)   SpO2 98%   BMI 37.86 kg/m²     Chest pain denied   SOB denied   Palpitations denied   Swelling in hands/feet denied   Dizziness denied   Recent hospital stays denied   Refills denied

## 2022-08-03 NOTE — PROGRESS NOTES
Orders for Check an echo when possible   See me in 1 year  per Dr. Cherrie Villagomez.   Dx: murmur, hx syncope

## 2022-08-03 NOTE — PROGRESS NOTES
Gene Ortiz MD. [facility]              Patient: Luis Spencer  : 1955      Today's Date: 8/3/2022        HISTORY OF PRESENT ILLNESS:     History of Present Illness:    Referred for cardiac murmur     Denies history of heart disease of CHF. Denies problems with CP or sig SOB. Class 2 GAUTAM. Activity limited by back pain. No orthopnea. No recent syncope. PAST MEDICAL HISTORY:     Past Medical History:   Diagnosis Date    Class 2 obesity due to excess calories without serious comorbidity with body mass index (BMI) of 37.0 to 37.9 in adult     Compression fracture of body of thoracic vertebra (HCC)     Coronary artery calcification     mild CAC on CT scan     Gastroesophageal reflux disease without esophagitis     H/O gastric bypass     Iron deficiency     Osteopenia     Prediabetes     Primary hypertension            CURRENT MEDICATIONS:      Current Outpatient Medications   Medication Sig Dispense Refill    hydroCHLOROthiazide (HYDRODIURIL) 25 mg tablet       rosuvastatin (CRESTOR) 10 mg tablet Take 1 Tablet by mouth nightly. 90 Tablet 3    tiZANidine (ZANAFLEX) 4 mg capsule Take 4 mg by mouth three (3) times daily. pregabalin (LYRICA) 75 mg capsule Take  by mouth three (3) times daily. cholecalciferol, vitamin D3, 50 mcg (2,000 unit) tab Take 2,000 Units by mouth every other day. multivitamin with iron tablet Take 1 tablet by mouth daily. Indications: MINERAL DEFICIENCY PREVENTION      calcium carbonate (TUMS) 200 mg calcium (500 mg) chew Take 3 tablets by mouth daily. ibuprofen (MOTRIN) 800 mg tablet Take 800 mg by mouth. (Patient not taking: Reported on 10/29/2021)      traMADol 300 mg TM24 Take 300 mg by mouth as needed. (Patient not taking: Reported on 10/29/2021)      fluticasone (FLONASE) 50 mcg/actuation nasal spray 2 sprays nightly. (Patient not taking: Reported on 10/29/2021)      cyanocobalamin (VITAMIN B-12) 500 mcg tablet Take 500 mcg by mouth daily. citalopram (CELEXA) 20 mg tablet Take 10 mg by mouth daily. (Patient not taking: Reported on 10/29/2021)                 Allergies   Allergen Reactions    Latex Rash    Pcn [Penicillins] Swelling    Sulfur Nausea Only             SOCIAL HISTORY:     Social History     Tobacco Use    Smoking status: Never    Smokeless tobacco: Never   Substance Use Topics    Alcohol use: No           FAMILY HISTORY:     Family History   Problem Relation Age of Onset    Diabetes Mother              REVIEW OF SYMPTOMS:     Review of Symptoms:  Constitutional: Negative for fever, chills  HEENT: Negative for nosebleeds, tinnitus, and vision changes. Respiratory: Negative for cough, wheezing  Cardiovascular: Negative for orthopnea, claudication, syncope, and PND. Gastrointestinal: Negative for abdominal pain, diarrhea, melena. Genitourinary: Negative for dysuria  Musculoskeletal: + back pain   Skin: Negative for rash  Heme: No problems bleeding. Neurological: Negative for speech change and focal weakness. PHYSICAL EXAM:     Physical Exam:  Visit Vitals  /80 (BP 1 Location: Left upper arm, BP Patient Position: Sitting)   Pulse 82   Ht 5' 2\" (1.575 m)   Wt 207 lb (93.9 kg)   SpO2 98%   BMI 37.86 kg/m²     Patient appears generally well, mood and affect are appropriate and pleasant. HEENT:  Hearing intact, non-icteric, normocephalic, atraumatic. Neck Exam: Supple, No JVD or carotid bruits. Lung Exam: Clear to auscultation, even breath sounds. Cardiac Exam: Regular rate and rhythm with 3/6 systolic murmur RSB, nml S2  Abdomen: Soft, non-tender, normal bowel sounds. Obese   Extremities: Moves all ext well. No lower extremity edema.   MSKTL: Overall good ROM ext  Skin: No significant rashes  Psych: Appropriate affect  Neuro - Grossly intact        LABS / OTHER STUDIES reviewed:     Lab Results   Component Value Date/Time    Sodium 143 10/22/2018 03:20 PM    Potassium 4.0 10/22/2018 03:20 PM    Chloride 107 10/22/2018 03:20 PM    CO2 25 10/22/2018 03:20 PM    Anion gap 11 10/22/2018 03:20 PM    Glucose 89 10/22/2018 03:20 PM    BUN 5 (L) 10/22/2018 03:20 PM    Creatinine 0.57 10/22/2018 03:20 PM    BUN/Creatinine ratio 9 (L) 10/22/2018 03:20 PM    GFR est AA >60 10/22/2018 03:20 PM    GFR est non-AA >60 10/22/2018 03:20 PM    Calcium 9.1 10/22/2018 03:20 PM    Albumin 3.8 10/22/2018 03:20 PM     Labs 11/3/21 - chol 196, TG 95, HDL 79, LDL 98  Labs 5/25/22 - CMP Ok, A1c 5.9, CBC OK       CARDIAC DIAGNOSTICS:     Cardiac Evaluation Includes:  I reviewed the results below. EKG 8/3/22 - NSR, normal     CT Chest 6/17/22 - mild coronary calcifications, chronic hypoventilatory changes with left and right diaphragm elevations       ASSESSMENT AND PLAN:     Assessment and Plan:    1) Systolic Murmur, nml S2  - suspect AV calcifications   - check an echo     2) Mild coronary calcifications on CT scan   - She denies anginal complaints (prior stress test several years ago was normal per patient)   - due to back pain, she does not feel she can walk on a treadmill for a stress test -- may consider further CAD testing later for symptoms   - For primary prevention, will start a statin - crestor 10 mg daily     3) History of syncope   - Had tilt table test around 2003 - was told to get up slowly and she would be fine   - no problems lately     4) HTN  - BP OK - cont meds    5) PreDM    6) Chronic back pain     7) Phone FU. See me in 1 year. Works for [employer] (from home) - son and 2 grand sons lives with her     Donel MD Aditi, [address], [address]  [address]  Ph: [phone]   Ph [phone]      ADDENDUM   8/9/2022    CT heart Scan 6/1/22 - CAC score 11 (at 50th%)       ADDENDUM   8/30/2022    Echo 8/26/22 - LVEF 63%, mod LAE,  Mild aortic stenosis, AV mean gradient is 8 mmHg. AV peak gradient is 16 mmHg. AV peak velocity is 2.0 m/s. Mild-mod TR. Ao Root 3.7 cm     Will call       ADDENDUM   9/2/2022  I called patient with echo results.

## 2022-08-26 ENCOUNTER — ANCILLARY PROCEDURE (OUTPATIENT)
Dept: CARDIOLOGY CLINIC | Age: 67
End: 2022-08-26
Payer: COMMERCIAL

## 2022-08-26 VITALS
DIASTOLIC BLOOD PRESSURE: 72 MMHG | SYSTOLIC BLOOD PRESSURE: 114 MMHG | BODY MASS INDEX: 38.09 KG/M2 | HEIGHT: 62 IN | WEIGHT: 207 LBS

## 2022-08-26 DIAGNOSIS — Z87.898 HISTORY OF SYNCOPE: ICD-10-CM

## 2022-08-26 DIAGNOSIS — R01.1 MURMUR, CARDIAC: ICD-10-CM

## 2022-08-26 LAB
ECHO AO ASC DIAM: 3.4 CM
ECHO AO ASCENDING AORTA INDEX: 1.75 CM/M2
ECHO AO ROOT DIAM: 3.7 CM
ECHO AO ROOT INDEX: 1.91 CM/M2
ECHO AV AREA PEAK VELOCITY: 2.1 CM2
ECHO AV AREA VTI: 2.3 CM2
ECHO AV AREA/BSA PEAK VELOCITY: 1.1 CM2/M2
ECHO AV AREA/BSA VTI: 1.2 CM2/M2
ECHO AV MEAN GRADIENT: 8 MMHG
ECHO AV MEAN VELOCITY: 1.3 M/S
ECHO AV PEAK GRADIENT: 16 MMHG
ECHO AV PEAK VELOCITY: 2 M/S
ECHO AV VELOCITY RATIO: 0.55
ECHO AV VTI: 41.3 CM
ECHO EST RA PRESSURE: 3 MMHG
ECHO LA VOL 2C: 88 ML (ref 22–52)
ECHO LA VOL 4C: 74 ML (ref 22–52)
ECHO LA VOLUME AREA LENGTH: 87 ML
ECHO LA VOLUME INDEX A2C: 45 ML/M2 (ref 16–34)
ECHO LA VOLUME INDEX A4C: 38 ML/M2 (ref 16–34)
ECHO LA VOLUME INDEX AREA LENGTH: 45 ML/M2 (ref 16–34)
ECHO LV E' LATERAL VELOCITY: 8 CM/S
ECHO LV E' SEPTAL VELOCITY: 7 CM/S
ECHO LV EDV A2C: 121 ML
ECHO LV EDV A4C: 98 ML
ECHO LV EDV BP: 111 ML (ref 56–104)
ECHO LV EDV INDEX A4C: 51 ML/M2
ECHO LV EDV INDEX BP: 57 ML/M2
ECHO LV EDV NDEX A2C: 62 ML/M2
ECHO LV EJECTION FRACTION A2C: 60 %
ECHO LV EJECTION FRACTION A4C: 65 %
ECHO LV EJECTION FRACTION BIPLANE: 63 % (ref 55–100)
ECHO LV ESV A2C: 49 ML
ECHO LV ESV A4C: 34 ML
ECHO LV ESV BP: 41 ML (ref 19–49)
ECHO LV ESV INDEX A2C: 25 ML/M2
ECHO LV ESV INDEX A4C: 18 ML/M2
ECHO LV ESV INDEX BP: 21 ML/M2
ECHO LV FRACTIONAL SHORTENING: 32 % (ref 28–44)
ECHO LV INTERNAL DIMENSION DIASTOLE INDEX: 2.27 CM/M2
ECHO LV INTERNAL DIMENSION DIASTOLIC: 4.4 CM (ref 3.9–5.3)
ECHO LV INTERNAL DIMENSION SYSTOLIC INDEX: 1.55 CM/M2
ECHO LV INTERNAL DIMENSION SYSTOLIC: 3 CM
ECHO LV IVSD: 0.9 CM (ref 0.6–0.9)
ECHO LV MASS 2D: 137.8 G (ref 67–162)
ECHO LV MASS INDEX 2D: 71 G/M2 (ref 43–95)
ECHO LV POSTERIOR WALL DIASTOLIC: 1 CM (ref 0.6–0.9)
ECHO LV RELATIVE WALL THICKNESS RATIO: 0.45
ECHO LVOT AREA: 3.8 CM2
ECHO LVOT AV VTI INDEX: 0.6
ECHO LVOT DIAM: 2.2 CM
ECHO LVOT MEAN GRADIENT: 3 MMHG
ECHO LVOT PEAK GRADIENT: 5 MMHG
ECHO LVOT PEAK VELOCITY: 1.1 M/S
ECHO LVOT STROKE VOLUME INDEX: 48.6 ML/M2
ECHO LVOT SV: 94.2 ML
ECHO LVOT VTI: 24.8 CM
ECHO MV A VELOCITY: 0.66 M/S
ECHO MV AREA PHT: 3.8 CM2
ECHO MV AREA VTI: 4.8 CM2
ECHO MV E DECELERATION TIME (DT): 200.9 MS
ECHO MV E VELOCITY: 0.57 M/S
ECHO MV E/A RATIO: 0.86
ECHO MV E/E' LATERAL: 7.13
ECHO MV E/E' RATIO (AVERAGED): 7.63
ECHO MV E/E' SEPTAL: 8.14
ECHO MV LVOT VTI INDEX: 0.79
ECHO MV MAX VELOCITY: 0.7 M/S
ECHO MV MEAN GRADIENT: 1 MMHG
ECHO MV MEAN VELOCITY: 0.4 M/S
ECHO MV PEAK GRADIENT: 2 MMHG
ECHO MV PRESSURE HALF TIME (PHT): 58.3 MS
ECHO MV VTI: 19.6 CM
ECHO RIGHT VENTRICULAR SYSTOLIC PRESSURE (RVSP): 36 MMHG
ECHO RV INTERNAL DIMENSION: 3.7 CM
ECHO RV TAPSE: 2 CM (ref 1.7–?)
ECHO TV REGURGITANT MAX VELOCITY: 2.86 M/S
ECHO TV REGURGITANT PEAK GRADIENT: 33 MMHG

## 2022-08-26 PROCEDURE — 93306 TTE W/DOPPLER COMPLETE: CPT | Performed by: SPECIALIST

## 2022-11-30 LAB
CREATININE, EXTERNAL: 0.67
HBA1C MFR BLD HPLC: 5.8 %

## 2023-07-28 RX ORDER — ROSUVASTATIN CALCIUM 10 MG/1
10 TABLET, COATED ORAL DAILY
Qty: 90 TABLET | Refills: 0 | Status: SHIPPED | OUTPATIENT
Start: 2023-07-28

## 2023-07-28 NOTE — TELEPHONE ENCOUNTER
Refill per VO of Dr. Sola East  Last appt: 8/3/2022    Future Appointments   Date Time Provider [address] [place] [address]   8/9/2023  9:00 AM MD JW Medina AMB       Requested Prescriptions     Pending Prescriptions Disp Refills    rosuvastatin (CRESTOR) 10 MG tablet 30 tablet 0     Sig: Take 1 tablet by mouth

## 2023-09-05 ENCOUNTER — OFFICE VISIT (OUTPATIENT)
Age: 68
End: 2023-09-05
Payer: COMMERCIAL

## 2023-09-05 VITALS
DIASTOLIC BLOOD PRESSURE: 78 MMHG | BODY MASS INDEX: 36.99 KG/M2 | SYSTOLIC BLOOD PRESSURE: 100 MMHG | HEART RATE: 78 BPM | OXYGEN SATURATION: 98 % | HEIGHT: 62 IN | WEIGHT: 201 LBS

## 2023-09-05 DIAGNOSIS — I10 PRIMARY HYPERTENSION: ICD-10-CM

## 2023-09-05 DIAGNOSIS — I35.0 AORTIC STENOSIS, MILD: Primary | ICD-10-CM

## 2023-09-05 DIAGNOSIS — R01.1 CARDIAC MURMUR, UNSPECIFIED: ICD-10-CM

## 2023-09-05 DIAGNOSIS — E78.2 MIXED HYPERLIPIDEMIA: ICD-10-CM

## 2023-09-05 PROCEDURE — 1123F ACP DISCUSS/DSCN MKR DOCD: CPT | Performed by: SPECIALIST

## 2023-09-05 PROCEDURE — 93000 ELECTROCARDIOGRAM COMPLETE: CPT | Performed by: SPECIALIST

## 2023-09-05 PROCEDURE — 3078F DIAST BP <80 MM HG: CPT | Performed by: SPECIALIST

## 2023-09-05 PROCEDURE — 3074F SYST BP LT 130 MM HG: CPT | Performed by: SPECIALIST

## 2023-09-05 PROCEDURE — 99214 OFFICE O/P EST MOD 30 MIN: CPT | Performed by: SPECIALIST

## 2023-09-05 RX ORDER — CHLORTHALIDONE 25 MG/1
12.5 TABLET ORAL EVERY OTHER DAY
Qty: 30 TABLET | Refills: 3 | Status: SHIPPED | OUTPATIENT
Start: 2023-09-05

## 2023-09-05 RX ORDER — CHLORTHALIDONE 25 MG/1
25 TABLET ORAL EVERY OTHER DAY
COMMUNITY
Start: 2023-06-01 | End: 2023-09-05 | Stop reason: SDUPTHER

## 2023-09-05 RX ORDER — SEMAGLUTIDE 0.68 MG/ML
INJECTION, SOLUTION SUBCUTANEOUS
COMMUNITY
Start: 2023-06-07

## 2023-09-05 NOTE — PROGRESS NOTES
Room # 6    Chief Complaint   Patient presents with    Annual Exam    Heart Murmur    Hyperlipidemia     Vitals:    09/05/23 1026   BP: 100/78   Site: Left Upper Arm   Position: Sitting   Pulse: 78   SpO2: 98%   Weight: 201 lb (91.2 kg)   Height: 5' 2\" (1.575 m)       Chest pain denied     SOB denied     Palpitations denied     Swelling in hands/feet denied     Dizziness denied     Recent hospital stays denied     Refills denied
peak velocity is 2.0 m/s. Mild-mod  TR. Ao Root 3.7 cm       ASSESSMENT AND PLAN:     Assessment and Plan:    1) Mild Aortic Stenosis   -  Echo 8/26/22 - LVEF 63%, mod LAE,  Mild aortic stenosis, AV mean gradient is 8 mmHg. AV peak gradient is 16 mmHg. AV peak velocity is 2.0 m/s. Mild-mod  TR. Ao Root 3.7 cm   ---> recheck an echo next year       2) Mild coronary calcifications on CT scan   - CT heart Scan 6/1/22 - CAC score 11 (at 50th%)    - She denies anginal complaints (prior stress test several years ago was normal per patient)    - due to back pain, she does not feel she can walk on a treadmill for a stress test -- may consider further CAD testing later for symptoms    - For primary prevention,  started a statin - crestor 10 mg daily       3) History of syncope    - Had tilt table test around 2003 - was told to get up slowly and she would be fine    - no problems lately       4) HTN   - Since BP running low --> will have her cut back chlorthalidone to 12.5 mg (she is taking it every other day) -- FU with PCP       5) PreDM      6) Chronic back pain       7) See me in 1 year. Works for [employer] (from home partly) - son and 2 grand sons lives with her        eTssa Myers MD, [address] [facility], [address]  [address] [facility].  [address]  [place], [address] [place] [address], [address] [place] [address]  Ph: [phone]   Ph [phone]

## 2023-09-05 NOTE — PATIENT INSTRUCTIONS
Patient Education        High Blood Pressure: Care Instructions  Overview     It's normal for blood pressure to go up and down throughout the day. But if it stays up, you have high blood pressure. Another name for high blood pressure is hypertension. For diagnosis, the top number may be 130 to 140 or higher. The bottom number may be 80 to 90 or higher. Despite what a lot of people think, high blood pressure usually doesn't cause headaches or make you feel dizzy or lightheaded. It usually has no symptoms. But it does increase your risk of stroke, heart attack, and other problems. You and your doctor will talk about your risks of these problems based on your blood pressure. Your doctor will give you a goal for your blood pressure. Your goal will be based on your health and your age. Lifestyle changes, such as eating healthy and being active, are always important to help lower blood pressure. You might also take medicine to reach your blood pressure goal.  Follow-up care is a key part of your treatment and safety. Be sure to make and go to all appointments, and call your doctor if you are having problems. It's also a good idea to know your test results and keep a list of the medicines you take. How can you care for yourself at home? Medical treatment  If you stop taking your medicine, your blood pressure will go back up. You may take one or more types of medicine to lower your blood pressure. Be safe with medicines. Take your medicine exactly as prescribed. Call your doctor if you think you are having a problem with your medicine. Talk to your doctor before you start taking aspirin every day. Aspirin can help certain people lower their risk of a heart attack or stroke. But taking aspirin isn't right for everyone, because it can cause serious bleeding. See your doctor regularly. You may need to see the doctor more often at first or until your blood pressure comes down.   If you are taking blood pressure medicine,

## 2023-10-26 RX ORDER — ROSUVASTATIN CALCIUM 10 MG/1
10 TABLET, COATED ORAL DAILY
Qty: 90 TABLET | Refills: 3 | Status: SHIPPED | OUTPATIENT
Start: 2023-10-26

## 2023-10-26 NOTE — TELEPHONE ENCOUNTER
Refill per VO of Dr. Haley Cedeno  Last appt: 9/5/2023    Future Appointments   Date Time Provider [address]   9/12/2024  9:00 AM [facility] ECHO 1 [facility] AMB   9/12/2024 10:00 AM Dayan Roldan MD [facility]       Requested Prescriptions     Signed Prescriptions Disp Refills    rosuvastatin (CRESTOR) 10 MG tablet 90 tablet 3     Sig: TAKE 1 TABLET BY MOUTH DAILY     Authorizing Provider: Dayan Roldan     Ordering User: Dahlia Valverde

## 2024-01-22 ENCOUNTER — APPOINTMENT (OUTPATIENT)
Facility: HOSPITAL | Age: 69
End: 2024-01-22
Payer: COMMERCIAL

## 2024-01-22 ENCOUNTER — HOSPITAL ENCOUNTER (EMERGENCY)
Facility: HOSPITAL | Age: 69
Discharge: HOME | End: 2024-01-22
Attending: EMERGENCY MEDICINE
Payer: COMMERCIAL

## 2024-01-22 VITALS
BODY MASS INDEX: 34.96 KG/M2 | WEIGHT: 190 LBS | HEIGHT: 62 IN | OXYGEN SATURATION: 98 % | RESPIRATION RATE: 16 BRPM | SYSTOLIC BLOOD PRESSURE: 148 MMHG | TEMPERATURE: 97.7 F | HEART RATE: 74 BPM | DIASTOLIC BLOOD PRESSURE: 90 MMHG

## 2024-01-22 DIAGNOSIS — S82.832A CLOSED FRACTURE OF PROXIMAL END OF LEFT FIBULA, UNSPECIFIED FRACTURE MORPHOLOGY, INITIAL ENCOUNTER: Primary | ICD-10-CM

## 2024-01-22 PROCEDURE — 99283 EMERGENCY DEPT VISIT LOW MDM: CPT

## 2024-01-22 PROCEDURE — 73562 X-RAY EXAM OF KNEE 3: CPT

## 2024-01-22 PROCEDURE — 6370000000 HC RX 637 (ALT 250 FOR IP)

## 2024-01-22 RX ORDER — HYDROCODONE BITARTRATE AND ACETAMINOPHEN 5; 325 MG/1; MG/1
1 TABLET ORAL
Status: COMPLETED | OUTPATIENT
Start: 2024-01-22 | End: 2024-01-22

## 2024-01-22 RX ADMIN — HYDROCODONE BITARTRATE AND ACETAMINOPHEN 1 TABLET: 5; 325 TABLET ORAL at 18:48

## 2024-01-22 ASSESSMENT — PAIN SCALES - GENERAL: PAINLEVEL_OUTOF10: 4

## 2024-01-22 ASSESSMENT — PAIN DESCRIPTION - DESCRIPTORS: DESCRIPTORS: ACHING;DISCOMFORT

## 2024-01-22 ASSESSMENT — PAIN - FUNCTIONAL ASSESSMENT
PAIN_FUNCTIONAL_ASSESSMENT: PREVENTS OR INTERFERES WITH ALL ACTIVE AND SOME PASSIVE ACTIVITIES
PAIN_FUNCTIONAL_ASSESSMENT: 0-10

## 2024-01-22 ASSESSMENT — LIFESTYLE VARIABLES
HOW MANY STANDARD DRINKS CONTAINING ALCOHOL DO YOU HAVE ON A TYPICAL DAY: PATIENT DOES NOT DRINK
HOW OFTEN DO YOU HAVE A DRINK CONTAINING ALCOHOL: NEVER

## 2024-01-22 ASSESSMENT — PAIN DESCRIPTION - PAIN TYPE: TYPE: ACUTE PAIN

## 2024-01-22 ASSESSMENT — PAIN DESCRIPTION - LOCATION: LOCATION: LEG;KNEE

## 2024-01-22 ASSESSMENT — PAIN DESCRIPTION - ORIENTATION: ORIENTATION: LEFT

## 2024-01-22 ASSESSMENT — PAIN DESCRIPTION - FREQUENCY: FREQUENCY: CONTINUOUS

## 2024-01-22 NOTE — ED TRIAGE NOTES
Pt reports she was walking today and felt a \"pop\" in her left knee. Pt now reprots pain in the left knee when walking or bending it.  Pt arrives to Ed with crutch on affected side.

## 2024-01-23 NOTE — DISCHARGE INSTRUCTIONS
Discussed visit today.  Discussed that the x-ray showed a possible nondisplaced fracture of the proximal fibular tip.  I recommend that you follow-up with your Ortho provider by calling and scheduling an appointment tomorrow.    Return to the ER with any worsening of symptoms.

## 2024-01-23 NOTE — ED NOTES
Bedside and Verbal shift change report given to Laya (oncoming nurse) by Katrin (offgoing nurse). Report included the following information Nurse Handoff Report, ED Encounter Summary, ED SBAR, MAR, and Recent Results.

## 2024-01-23 NOTE — ED PROVIDER NOTES
[facility] EMERGENCY DEPT  EMERGENCY DEPARTMENT ENCOUNTER      Pt Name: Kelly Perez  MRN: [id number]  Birthdate 1955  Date of evaluation: 1/22/2024  Provider: Brendan Vora PA-C    CHIEF COMPLAINT       Chief Complaint   Patient presents with    Leg Pain         HISTORY OF PRESENT ILLNESS    Patient i a 68-year-old female with history of mild aortic stenosis, obesity, thoracic vertebral compression fracture, coronary artery calcification, GERD, gastric bypass, iron deficiency, osteopenia, prediabetes, and hypertension who presents emergency room with reports of left knee pain.  Patient reports she has been going through arthritis and osteopenia in both of her knees over the past few months.  Patient reports last month she was seen by Ortho in which they did not do any x-rays, but she had an injection that helped with her pain.  Patient reports that she was walking out of work today when she felt and heard a pop in her left knee.  Patient reports now she is having increased pain when walking or trying to bend it.  Patient reports she does have an MRI scheduled soon for this leg.  Patient denies chest pain, shortness of breath, abdominal pain, nausea or vomiting, diarrhea constipation, headache, dizziness, lightheadedness, fever, or chills.  Patient denies alcohol use, denies smoking/vaping or illicit drug use.           Nursing Notes were reviewed.    REVIEW OF SYSTEMS       Review of Systems   Musculoskeletal:         Left knee pain          PAST MEDICAL HISTORY     Past Medical History:   Diagnosis Date    Aortic stenosis, mild     Class 2 obesity due to excess calories without serious comorbidity with body mass index (BMI) of 37.0 to 37.9 in adult     Compression fracture of body of thoracic vertebra (HCC)     Coronary artery calcification     mild CAC on CT scan 6/22    Gastroesophageal reflux disease without esophagitis     H/O gastric bypass     Iron deficiency     Osteopenia     Prediabetes     Primary

## 2024-09-12 ENCOUNTER — OFFICE VISIT (OUTPATIENT)
Age: 69
End: 2024-09-12
Payer: COMMERCIAL

## 2024-09-12 VITALS
DIASTOLIC BLOOD PRESSURE: 80 MMHG | WEIGHT: 178 LBS | SYSTOLIC BLOOD PRESSURE: 120 MMHG | HEART RATE: 57 BPM | BODY MASS INDEX: 32.76 KG/M2 | HEIGHT: 62 IN | OXYGEN SATURATION: 97 %

## 2024-09-12 DIAGNOSIS — I35.0 NONRHEUMATIC AORTIC VALVE STENOSIS: ICD-10-CM

## 2024-09-12 DIAGNOSIS — R01.1 CARDIAC MURMUR, UNSPECIFIED: Primary | ICD-10-CM

## 2024-09-12 DIAGNOSIS — I10 PRIMARY HYPERTENSION: ICD-10-CM

## 2024-09-12 PROCEDURE — 3079F DIAST BP 80-89 MM HG: CPT | Performed by: SPECIALIST

## 2024-09-12 PROCEDURE — 1123F ACP DISCUSS/DSCN MKR DOCD: CPT | Performed by: SPECIALIST

## 2024-09-12 PROCEDURE — 99214 OFFICE O/P EST MOD 30 MIN: CPT | Performed by: SPECIALIST

## 2024-09-12 PROCEDURE — 3074F SYST BP LT 130 MM HG: CPT | Performed by: SPECIALIST

## 2024-09-12 PROCEDURE — 93000 ELECTROCARDIOGRAM COMPLETE: CPT | Performed by: SPECIALIST

## 2024-10-07 RX ORDER — ROSUVASTATIN CALCIUM 10 MG/1
10 TABLET, COATED ORAL DAILY
Qty: 90 TABLET | Refills: 3 | Status: SHIPPED | OUTPATIENT
Start: 2024-10-07

## 2024-10-07 NOTE — TELEPHONE ENCOUNTER
Refill per VO of Dr Arvizu  Last appt: 9/12/2024    Future Appointments   Date Time Provider Department Center   9/16/2025  8:40 AM Juancho Arvizu MD [facility] AM[facility]       Requested Prescriptions     Signed Prescriptions Disp Refills    rosuvastatin (CRESTOR) 10 MG tablet 90 tablet 3     Sig: TAKE 1 TABLET BY MOUTH DAILY     Authorizing Provider: JUANCHO ARVIZU     Ordering User: MIKE AUGUSTIN

## 2024-12-31 ENCOUNTER — TELEPHONE (OUTPATIENT)
Age: 69
End: 2024-12-31

## 2024-12-31 NOTE — TELEPHONE ENCOUNTER
Fax received from PCP including labs drawn 12/11/2024:    Lipids: , TG 65, HDL 80, VLDL 13, LDL-c 60    Chemistry:  Na 141, K 3.5, BUN 8, Cr 0.72, eGFR 91, Gluc 80, AST 22, ALT 15    Blood count:  Hgb 12.9, Hct 41.5    A1c: 5.9  TSH: 1.620  Vit D: 59.9

## (undated) DEVICE — Z DISCONTINUED NO SUB IDED SET EXTN W/ 4 W STPCOCK M SPIN LOK 36IN

## (undated) DEVICE — KIT IV STRT W CHLORAPREP PD 1ML

## (undated) DEVICE — SOLIDIFIER FLUID 3000 CC ABSORB

## (undated) DEVICE — BAG BELONG PT PERS CLEAR HANDL

## (undated) DEVICE — CANN NASAL O2 CAPNOGRAPHY AD -- FILTERLINE

## (undated) DEVICE — ENDO CARRY-ON PROCEDURE KIT INCLUDES ENZYMATIC SPONGE, GAUZE, BIOHAZARD LABEL, TRAY, LUBRICANT, DIRTY SCOPE LABEL, WATER LABEL, TRAY, DRAWSTRING PAD, AND DEFENDO 4-PIECE KIT.: Brand: ENDO CARRY-ON PROCEDURE KIT

## (undated) DEVICE — 1200 GUARD II KIT W/5MM TUBE W/O VAC TUBE: Brand: GUARDIAN

## (undated) DEVICE — SET ADMIN 16ML TBNG L100IN 2 Y INJ SITE IV PIGGY BK DISP

## (undated) DEVICE — CATH IV AUTOGRD BC BLU 22GA 25 -- INSYTE

## (undated) DEVICE — NEEDLE HYPO 18GA L1.5IN PNK S STL HUB POLYPR SHLD REG BVL

## (undated) DEVICE — BW-412T DISP COMBO CLEANING BRUSH: Brand: SINGLE USE COMBINATION CLEANING BRUSH

## (undated) DEVICE — SYRINGE MED 20ML STD CLR PLAS LUERLOCK TIP N CTRL DISP

## (undated) DEVICE — KENDALL RADIOLUCENT FOAM MONITORING ELECTRODE -RECTANGULAR SHAPE: Brand: KENDALL